# Patient Record
Sex: FEMALE | Race: WHITE
[De-identification: names, ages, dates, MRNs, and addresses within clinical notes are randomized per-mention and may not be internally consistent; named-entity substitution may affect disease eponyms.]

---

## 2021-12-13 ENCOUNTER — HOSPITAL ENCOUNTER (INPATIENT)
Dept: HOSPITAL 79 - ER1 | Age: 76
LOS: 8 days | DRG: 207 | End: 2021-12-21
Attending: STUDENT IN AN ORGANIZED HEALTH CARE EDUCATION/TRAINING PROGRAM | Admitting: INTERNAL MEDICINE
Payer: MEDICARE

## 2021-12-13 VITALS — BODY MASS INDEX: 37.89 KG/M2 | HEIGHT: 62.99 IN | WEIGHT: 213.85 LBS

## 2021-12-13 DIAGNOSIS — K63.1: ICD-10-CM

## 2021-12-13 DIAGNOSIS — R33.8: ICD-10-CM

## 2021-12-13 DIAGNOSIS — E83.51: ICD-10-CM

## 2021-12-13 DIAGNOSIS — Z51.5: ICD-10-CM

## 2021-12-13 DIAGNOSIS — R65.21: ICD-10-CM

## 2021-12-13 DIAGNOSIS — E66.9: ICD-10-CM

## 2021-12-13 DIAGNOSIS — N18.30: ICD-10-CM

## 2021-12-13 DIAGNOSIS — Z82.49: ICD-10-CM

## 2021-12-13 DIAGNOSIS — N17.0: ICD-10-CM

## 2021-12-13 DIAGNOSIS — E11.22: ICD-10-CM

## 2021-12-13 DIAGNOSIS — I48.0: ICD-10-CM

## 2021-12-13 DIAGNOSIS — Z79.84: ICD-10-CM

## 2021-12-13 DIAGNOSIS — Z98.890: ICD-10-CM

## 2021-12-13 DIAGNOSIS — I63.342: ICD-10-CM

## 2021-12-13 DIAGNOSIS — R18.8: ICD-10-CM

## 2021-12-13 DIAGNOSIS — Z90.89: ICD-10-CM

## 2021-12-13 DIAGNOSIS — I12.9: ICD-10-CM

## 2021-12-13 DIAGNOSIS — Z66: ICD-10-CM

## 2021-12-13 DIAGNOSIS — Z85.41: ICD-10-CM

## 2021-12-13 DIAGNOSIS — E83.32: ICD-10-CM

## 2021-12-13 DIAGNOSIS — E87.6: ICD-10-CM

## 2021-12-13 DIAGNOSIS — G93.41: ICD-10-CM

## 2021-12-13 DIAGNOSIS — K65.1: ICD-10-CM

## 2021-12-13 DIAGNOSIS — E83.41: ICD-10-CM

## 2021-12-13 DIAGNOSIS — Z88.5: ICD-10-CM

## 2021-12-13 DIAGNOSIS — E11.65: ICD-10-CM

## 2021-12-13 DIAGNOSIS — E87.5: ICD-10-CM

## 2021-12-13 DIAGNOSIS — J12.82: ICD-10-CM

## 2021-12-13 DIAGNOSIS — Z83.3: ICD-10-CM

## 2021-12-13 DIAGNOSIS — Z79.899: ICD-10-CM

## 2021-12-13 DIAGNOSIS — Z99.81: ICD-10-CM

## 2021-12-13 DIAGNOSIS — D61.818: ICD-10-CM

## 2021-12-13 DIAGNOSIS — U07.1: Primary | ICD-10-CM

## 2021-12-13 DIAGNOSIS — Z88.0: ICD-10-CM

## 2021-12-13 DIAGNOSIS — I63.133: ICD-10-CM

## 2021-12-13 DIAGNOSIS — Z79.01: ICD-10-CM

## 2021-12-13 DIAGNOSIS — J80: ICD-10-CM

## 2021-12-13 DIAGNOSIS — Z79.82: ICD-10-CM

## 2021-12-13 DIAGNOSIS — A41.9: ICD-10-CM

## 2021-12-13 DIAGNOSIS — E87.4: ICD-10-CM

## 2021-12-13 DIAGNOSIS — I47.2: ICD-10-CM

## 2021-12-13 DIAGNOSIS — R57.8: ICD-10-CM

## 2021-12-13 LAB
BUN/CREATININE RATIO: 28 (ref 0–10)
HGB BLD-MCNC: 11.2 GM/DL (ref 12.3–15.3)
RED BLOOD COUNT: 3.98 M/UL (ref 4–5.1)
WHITE BLOOD COUNT: 8.1 K/UL (ref 4.5–11)

## 2021-12-13 PROCEDURE — G0378 HOSPITAL OBSERVATION PER HR: HCPCS

## 2021-12-13 PROCEDURE — 8E0ZXY6 ISOLATION: ICD-10-PCS | Performed by: INTERNAL MEDICINE

## 2021-12-13 PROCEDURE — P9045 ALBUMIN (HUMAN), 5%, 250 ML: HCPCS

## 2021-12-13 PROCEDURE — XW033E5 INTRODUCTION OF REMDESIVIR ANTI-INFECTIVE INTO PERIPHERAL VEIN, PERCUTANEOUS APPROACH, NEW TECHNOLOGY GROUP 5: ICD-10-PCS | Performed by: INTERNAL MEDICINE

## 2021-12-13 PROCEDURE — 3E0333Z INTRODUCTION OF ANTI-INFLAMMATORY INTO PERIPHERAL VEIN, PERCUTANEOUS APPROACH: ICD-10-PCS | Performed by: INTERNAL MEDICINE

## 2021-12-13 PROCEDURE — P9047 ALBUMIN (HUMAN), 25%, 50ML: HCPCS

## 2021-12-13 PROCEDURE — C9113 INJ PANTOPRAZOLE SODIUM, VIA: HCPCS

## 2021-12-14 LAB
BUN/CREATININE RATIO: 39 (ref 0–10)
HGB BLD-MCNC: 11.9 GM/DL (ref 12.3–15.3)
RED BLOOD COUNT: 4.31 M/UL (ref 4–5.1)
WHITE BLOOD COUNT: 5.6 K/UL (ref 4.5–11)

## 2021-12-14 PROCEDURE — 5A0945A ASSISTANCE WITH RESPIRATORY VENTILATION, 24-96 CONSECUTIVE HOURS, HIGH NASAL FLOW/VELOCITY: ICD-10-PCS | Performed by: INTERNAL MEDICINE

## 2021-12-14 NOTE — NUR
PATIENT NOTED TO DESAT DOWN TO 80% ON 10 LHFNC. PATIENT O2 INCREASED TO 15
LNC. PROVIDER MADE AWARE OF CHANGE IN PATIENT CONDITION. NEW ORDERS GIVEN.
PATIENT IS IN MILD DISTRESS AT THIS TIME. RT AT BEDSIDE TO CHANGE PATIENT TO
AIRVO AND DO AN ABG. PATIENT LUNG SOUNDS ARE DIMINSHED THROUGHOUT. WILL
CONTINUE TO MONITOR

## 2021-12-14 NOTE — NUR
PATIENT HAS HAD NO URINE OUTPUT ON THE SHIFT THIS FAR. PATIENT HAS AN EXTERNAL
CATH. PROVIDER MADE AWARE ANS GAVE AN ORDER TO ANCHOR A JOHNSON AT THIS TIME.

## 2021-12-15 LAB
BUN/CREATININE RATIO: 38 (ref 0–10)
BUN/CREATININE RATIO: 51 (ref 0–10)
HGB BLD-MCNC: 15.2 GM/DL (ref 12.3–15.3)
RED BLOOD COUNT: 5.7 M/UL (ref 4–5.1)
WHITE BLOOD COUNT: 22 K/UL (ref 4.5–11)

## 2021-12-15 PROCEDURE — 3E033XZ INTRODUCTION OF VASOPRESSOR INTO PERIPHERAL VEIN, PERCUTANEOUS APPROACH: ICD-10-PCS | Performed by: STUDENT IN AN ORGANIZED HEALTH CARE EDUCATION/TRAINING PROGRAM

## 2021-12-15 PROCEDURE — 4A133J1 MONITORING OF ARTERIAL PULSE, PERIPHERAL, PERCUTANEOUS APPROACH: ICD-10-PCS | Performed by: INTERNAL MEDICINE

## 2021-12-15 PROCEDURE — 0T9B70Z DRAINAGE OF BLADDER WITH DRAINAGE DEVICE, VIA NATURAL OR ARTIFICIAL OPENING: ICD-10-PCS | Performed by: STUDENT IN AN ORGANIZED HEALTH CARE EDUCATION/TRAINING PROGRAM

## 2021-12-15 PROCEDURE — 5A1955Z RESPIRATORY VENTILATION, GREATER THAN 96 CONSECUTIVE HOURS: ICD-10-PCS | Performed by: STUDENT IN AN ORGANIZED HEALTH CARE EDUCATION/TRAINING PROGRAM

## 2021-12-15 PROCEDURE — 0BH17EZ INSERTION OF ENDOTRACHEAL AIRWAY INTO TRACHEA, VIA NATURAL OR ARTIFICIAL OPENING: ICD-10-PCS | Performed by: ANESTHESIOLOGY

## 2021-12-15 PROCEDURE — 03HY32Z INSERTION OF MONITORING DEVICE INTO UPPER ARTERY, PERCUTANEOUS APPROACH: ICD-10-PCS | Performed by: INTERNAL MEDICINE

## 2021-12-15 PROCEDURE — 0DH63UZ INSERTION OF FEEDING DEVICE INTO STOMACH, PERCUTANEOUS APPROACH: ICD-10-PCS | Performed by: STUDENT IN AN ORGANIZED HEALTH CARE EDUCATION/TRAINING PROGRAM

## 2021-12-15 PROCEDURE — 05HN33Z INSERTION OF INFUSION DEVICE INTO LEFT INTERNAL JUGULAR VEIN, PERCUTANEOUS APPROACH: ICD-10-PCS | Performed by: INTERNAL MEDICINE

## 2021-12-15 PROCEDURE — 4A133B1 MONITORING OF ARTERIAL PRESSURE, PERIPHERAL, PERCUTANEOUS APPROACH: ICD-10-PCS | Performed by: INTERNAL MEDICINE

## 2021-12-15 NOTE — NUR
PATIENT NOTED TO NE MORE CONFUSED TODAY THAN YESTERDAY. DURING ASSESSMENT
PATIENTS LEFT PUPIL IS DIALTED AND NOT REACTIVE. PROVIDER MADE AWARE. ORDERS
FOR ABG GIVEN.

## 2021-12-15 NOTE — NUR
UPON  INITIAL ASSESMENT PATIENT IS NOTED TO BE IN SOFT WRIST RESTRAINTS
BILATERALLY. PATIENT IS REQUIRING MORE OXYGEN. PATIENT IS IN BED WITH CALL
LIGHT IN REACH.
ALL
NEUROVASCULAR CHECKS ARE INTACT. PATIENT OFFERED DRINK AND FOOD AND DECLINED.
PATIENT HAS A JOHNSON CATHETER IN PLACE. PATIENTS BRIEF IS CLEAN AND DRY.
PATIENT TURNED AND REPOSTIONED. PATIENT VITALS STABLE. DAY SHIFT PROVIDER MADE
AWARE OF PATIENT BEING IN RESTRAINTS AND GAVE ORDER TO D/C.  AND
HOUSE SUPERVISOR MADE AWARE AS WELL. PATIENT HAS A SITTER AT BEDSIDE. PATIENT
FAMILY IS UNABLE TO BE CONTACTED AT THIS TIME DUE TO NO WORKING NUMBER BEING
RECORDED IN THE CHART.

## 2021-12-15 NOTE — NUR
PATIENTS DAUGHTER CONTACTED AT THIS TIME AND GIVEN AN UPDATE ABOUT PATIENTS
CONDITION. SHE STATES THAT SHE GIVES CONSENT FOR TRANSFER IF NEEDED. SHE
STATES THAT SHE IS GOING TO GET EMERGENCY POWER OF  SO THAT SHE CAN
MAKE MEDICAL DECCISONS AND REVERSE THE PATIENTS DNI STATUS.

## 2021-12-15 NOTE — NUR
PATIENT CONTINUED TO DECLINE AND TO REQUIRE HIGH AMOUNTS OF OXYGEN. PATIENTS
FAMILY WAS CONTACTED AND THE DAUGHTER MONIQUE AND HER SISTER AMALIA STATED THAT
THEY WANT THE PATIENT TO BE A FULL CODE AND FOR ALL LIFE SAVING MEASURES TO BE
IMPLEMENTED. THE DAUGHTERS GAVE CONSENT FOR PATIENT TO BE INTUBATED AND FOR
HER TO BE TRANSFERRED TO ANOTHER FACILITY IF NEEDED. , CASE
MANAGER, MD, RN AND  WERE ALL PRESENT FOR TELEPHONE DISCUSSION
WITH FAMILY.
ALL MEMBERS OF CARE TEAM MADE DECISION TO INTUBATE DUE TO FAMILY
GETTING EMERGENCY GAURDIANSHIP AND DUE TO THE FACT THAT ALL CHILDREN WERE IN
AGREEMENT TO HAVE PATIENT BECOME A FULL CODE. I BEGAN TO CALL RRT AND HOUSE
MANAGER STATED THAT WE WOULD DO A CONTROLED INTUBATION AND TO NOT PORTER AN RRT
. ER MD CALLED TO DO
INTUBATION AND THEY STATED THAT THEY COULD NOT COME DUE TO BEING THE ONLY MD
IN ER. ANESTESIA WAS CALLED TO INTUBATE AND  REPORTED TO BEDSIDE.
, RT, MYSELF THE RN , RAF, PHARMACIST WERE AT BEDSIDE. 
GAVE THE VERBAL ORDER FOR 200MG OF PROPOFOL IV PUSH IT WAS GIVEN AT 1206. THE
ULTRASOUND GUIDED IV THAT WAS PLACED IN THE PATIENTS ERNESTINA WAS USED AND WAS
FLUSHED WITH NO RESSITANCE. INTUBATION TOOK PLACE AT 1208 BY  WHILE
PATIENT WAS STILL AWAKE. I RECCOMENDED GIVING AMIDATE TO ADD SEDATION AND HE
DECLINED THE SUGGESTION AND CONTINUED WITH INTUBATION. PATIENT WAS INTUBATED
BY  USING A GLIDESCOPE AND A 7.5 MM TUBE. TUBE WAS SECURED AT 22CM AT
THE LIP WITH A TUBE WEBB. COLOR CHANGE WAS NOTED INTITIALLY ON CO2 DETECTOR
ALONG WTH LUNG SOUNDS THROUGHOUT. TUBE PLACEMENT WAS CONFIRMED BY CHEST XRAY.
 GAVE A VERBAL ORDER TO START A PROPOFOL DRIP ON THE PATIENT PER
HOSPITAL PROTOCOL TO AID IN SEDATION.  ALSO GAVE A VERBAL ORDER TO
GIVE THE PATIENT 5MG OF VERSED IV PUSH AND IT WAS GIVEN AT 1225. MARITZA LARA
GAVE THE VERBAL ORDER TO START PATIENT ON NS AT A RATE OF 60 ML/HR. ICU NURSE
ARRIVED TO TAKE PATIENT OFF THE UNIT. PATIENT BECAME HYPOTENSIVE AND 
GAVE THE VERBAL ORDER TO TAKE PATIENT TO ICU PRIOR TO OBTAINING HEAD CT IN
ORDER TO BETTER STABLIZE PATIENT.  GAVE A VERBAL ORDER TO START THE
PATIENT ON A LEVOPHED DRIP PER HOSPITAL PROTOCOL. PATIENT WAS PLACED ON A
PORTABLE MONITOR TO BE TRANSPORTED. PATIENT WAS BAGGED BY RESPIRATORY UNTIL
THEY ARRIVED IN ICU.

## 2021-12-16 LAB
BUN/CREATININE RATIO: 35 (ref 0–10)
BUN/CREATININE RATIO: 36 (ref 0–10)
BUN/CREATININE RATIO: 38 (ref 0–10)
HGB BLD-MCNC: 13.2 GM/DL (ref 12.3–15.3)
RED BLOOD COUNT: 4.49 M/UL (ref 4–5.1)
WHITE BLOOD COUNT: 21.1 K/UL (ref 4.5–11)

## 2021-12-16 PROCEDURE — B24BZZ4 ULTRASONOGRAPHY OF HEART WITH AORTA, TRANSESOPHAGEAL: ICD-10-PCS | Performed by: STUDENT IN AN ORGANIZED HEALTH CARE EDUCATION/TRAINING PROGRAM

## 2021-12-17 LAB
BUN/CREATININE RATIO: 34 (ref 0–10)
BUN/CREATININE RATIO: 40 (ref 0–10)
HGB BLD-MCNC: 10.4 GM/DL (ref 12.3–15.3)
HGB BLD-MCNC: 9 GM/DL (ref 12.3–15.3)
RED BLOOD COUNT: 2.98 M/UL (ref 4–5.1)
RED BLOOD COUNT: 3.54 M/UL (ref 4–5.1)
WHITE BLOOD COUNT: 15.4 K/UL (ref 4.5–11)
WHITE BLOOD COUNT: 9.3 K/UL (ref 4.5–11)

## 2021-12-18 LAB
BUN/CREATININE RATIO: 43 (ref 0–10)
HGB BLD-MCNC: 8.1 GM/DL (ref 12.3–15.3)
RED BLOOD COUNT: 2.76 M/UL (ref 4–5.1)
WHITE BLOOD COUNT: 6.3 K/UL (ref 4.5–11)

## 2021-12-19 LAB
BUN/CREATININE RATIO: 47 (ref 0–10)
HGB BLD-MCNC: 8.3 GM/DL (ref 12.3–15.3)
RED BLOOD COUNT: 2.91 M/UL (ref 4–5.1)
WHITE BLOOD COUNT: 8.1 K/UL (ref 4.5–11)

## 2021-12-20 LAB
BUN/CREATININE RATIO: 50 (ref 0–10)
HGB BLD-MCNC: 9.2 GM/DL (ref 12.3–15.3)
RED BLOOD COUNT: 3.27 M/UL (ref 4–5.1)
WHITE BLOOD COUNT: 14.6 K/UL (ref 4.5–11)

## 2021-12-20 NOTE — NUR
TEMP 103.8 PER BLADDER TEMP MONITORING.COOLING BLANKET WAS APPLIED PRIOR TO MY
ASSESSMENT. ICE PACKS X 4 APPLIED UNDER EACH AXILLARY AREA AND BILATERAL GROIN
AREAS. ARTERIAL LINE BLOOD PRESSURE 88/49 ( MAP 57) NEOSYNEPHRINE DRIP STARTED
AT 25 MCGS/MIN. PRECEDEX DRIP WAS DECREASED TO 1.2 MCGS/KG/HR . WILL CONTINUE
TO MONITOR . ASSESSMENT COMPLETED AS CHARTED.

## 2021-12-20 NOTE — NUR
TEMP 103.1 ICE PACKS REMOVED AT THIS TIME COOLING BLANKET REMAINS. /56
WITH MAP 78 . NEOSYNEPHRINE DRIP REMAINS AT 25 MCGS.

## 2021-12-21 LAB
BUN/CREATININE RATIO: 44 (ref 0–10)
HEPARIN INDUCED PLATELET AB: 0.1 OD (ref 0–0.4)
HGB BLD-MCNC: 10.1 GM/DL (ref 12.3–15.3)
RED BLOOD COUNT: 3.58 M/UL (ref 4–5.1)
WHITE BLOOD COUNT: 36.9 K/UL (ref 4.5–11)

## 2021-12-21 NOTE — NUR
family was notified by doctor Ramana Alfredo THIS MORNING OF PTS WORSENING CONFITION
AND CRITICAL LAB VALUES. FAMILY HAS SINCE CALLED AND ARE WANTING TO MAKE THE
PAINT COMFORT CARE ONCE THEY GET HERE. DOCTOR PERRY AND DOCTOR MACK WERE
NOTIFIED OF THIS REQUEST AND COMFORT CARE ORDERS WERE GIVEN. PATIENT WAS
ALREADY RULED OUT BY DANIEL.

## 2021-12-21 NOTE — NUR
CARDIAC TIME OF DEATH 1422. FAMILY AT BEDSIDE REQUESTING TO CLEAN THE PT UP.
[facility] HAS BEEN NOTIFIFED

## 2021-12-21 NOTE — NUR
TUBE FEEDING NOTED COMING FROM MOUTH . TUBE FEEDING STOPPED. OGT PLACEMENT
VERIFIED BY AUSCULTATION AND ALSO VERIFIED BY SECOND R.N. SCANT AMOUNT OF
RESIDUAL OBTAINED WHEN MANUAL ASPIRATION WAS PERFORMED ON OGT. OGT WAS THEN
HOOKED TO LOW WALL SUCTION AND NO RESIDUAL WAS OBTAINED. OGT CLAMPED AT THIS
TIME AND WILL CONTINUE TO HOLD FEEDING FOR NOW.